# Patient Record
Sex: FEMALE | Race: WHITE | NOT HISPANIC OR LATINO | URBAN - METROPOLITAN AREA
[De-identification: names, ages, dates, MRNs, and addresses within clinical notes are randomized per-mention and may not be internally consistent; named-entity substitution may affect disease eponyms.]

---

## 2023-06-22 ENCOUNTER — APPOINTMENT (RX ONLY)
Dept: URBAN - METROPOLITAN AREA CLINIC 154 | Facility: CLINIC | Age: 48
Setting detail: DERMATOLOGY
End: 2023-06-22

## 2023-06-22 DIAGNOSIS — Z41.9 ENCOUNTER FOR PROCEDURE FOR PURPOSES OTHER THAN REMEDYING HEALTH STATE, UNSPECIFIED: ICD-10-CM

## 2023-06-22 PROCEDURE — ? MICRONEEDLING

## 2023-06-22 NOTE — PROCEDURE: MICRONEEDLING
Depth In Mm: 1
Depth In Mm: 0.1
Location #1: Forehead, nose, eyes
Consent: Written consent obtained, risks reviewed including but not limited to pain, scarring, infection and incomplete improvement.  Patient understands the procedure is cosmetic in nature and will require out of pocket payment.
Topical Anesthesia?: 23% lidocaine, 7% tetracaine
Detail Level: Zone
Treatment Number (Optional): 0
Depth In Mm: 0.5
Length Of Topical Anesthesia Application (Optional): 30 minutes
Post-Care Instructions: After the procedure, take precautions agains sun exposure. Do not apply sunscreen for 12 hours after the procedure. Do not apply make-up for 12 hours after the procedure. Avoid alcohol based toners for 10-14 days. After 2-3 days patients can return to their regular skin regimen.
Location #2: cheeks
Infusions (Optional): growth factor
Depth In Mm: 2

## 2023-08-31 ENCOUNTER — APPOINTMENT (RX ONLY)
Dept: URBAN - METROPOLITAN AREA CLINIC 154 | Facility: CLINIC | Age: 48
Setting detail: DERMATOLOGY
End: 2023-08-31

## 2023-08-31 DIAGNOSIS — Z41.9 ENCOUNTER FOR PROCEDURE FOR PURPOSES OTHER THAN REMEDYING HEALTH STATE, UNSPECIFIED: ICD-10-CM

## 2023-08-31 PROCEDURE — ? SCITON BBL HERO

## 2023-08-31 PROCEDURE — ? SCITON MOXI

## 2023-08-31 ASSESSMENT — LOCATION ZONE DERM: LOCATION ZONE: FACE

## 2023-08-31 ASSESSMENT — LOCATION SIMPLE DESCRIPTION DERM: LOCATION SIMPLE: RIGHT CHEEK

## 2023-08-31 ASSESSMENT — LOCATION DETAILED DESCRIPTION DERM: LOCATION DETAILED: RIGHT INFERIOR CENTRAL MALAR CHEEK

## 2023-08-31 NOTE — PROCEDURE: SCITON BBL HERO
Fluence (J/Cm2) - Will Not Render If 0: 5
Fluence (J/Cm2) - Will Not Render If 0: 0
Total Pulses (Optional): 500
Temp (C): 20
Number Of Prepaid Treatments (Will Not Render If 0): 3
Total Pulses (Optional): 500
Spot Size: Finesse Adapter Size: 15 x 15 mm square
Add Setting 2?: yes
Skin Type: III
Add Setting 3?: no
Pulse Width Units: milliseconds
Spot Size: Finesse Adapter Size: 11 mm round
Filter: 515nm Filter
Filter: 560nm Filter
Detail Level: Zone
Topical Anesthesia Type: 23% lidocaine, 7% tetracaine
Temp (C): 25
Temp (C): 20
Consent: Written consent obtained.  Risks reviewed including but not limited to crusting, scabbing, blistering, scarring, darker or lighter pigmentary change, and incomplete clearance.
Temp (C): 24
Procedure Note: After applying gel and applying protective eyeware, treatment was administered using the setting parameters listed above.
Anesthesia Type: 1% lidocaine with epinephrine
Post-Care Instructions: I reviewed with the patient in detail post-care instructions. Patient should avoid sun exposure before and after treatment.  Patient should wear sunscreen.
Rate (Hz): 4
Treatment Number: 1

## 2023-08-31 NOTE — PROCEDURE: SCITON MOXI
Treatment Number: 1
Post-Care Instructions: I reviewed with the patient in detail post-care instructions.  Recommended diligent sun protection and sun avoidance.
Total Coverage (%): 20%
Pre=procedure Text: After consent was obtained, the treatment areas were cleaned and treated using the parameters noted above.
Anesthesia Type: 1% lidocaine with epinephrine
Length Of Topical Anesthesia Application (Optional): 30 minutes
Consent: Written consent obtained.  Risks were reviewed including but not limited to crusting, scabbing, blistering, scarring, darker or lighter pigmentary change, incomplete improvement, prolonged erythema and facial swelling, infection, and bleeding.
Topical Anesthesia?: 23% lidocaine, 7% tetracaine
Detail Level: Detailed
Energy (J/Cm2): 20
Level 3
Number Of Passes: 6
External Cooling Fan Speed: 5
Laser Type: Fractionated 1927nm, thulium laser

## 2023-10-23 ENCOUNTER — APPOINTMENT (RX ONLY)
Dept: URBAN - METROPOLITAN AREA CLINIC 154 | Facility: CLINIC | Age: 48
Setting detail: DERMATOLOGY
End: 2023-10-23

## 2023-10-23 DIAGNOSIS — Z41.9 ENCOUNTER FOR PROCEDURE FOR PURPOSES OTHER THAN REMEDYING HEALTH STATE, UNSPECIFIED: ICD-10-CM

## 2023-10-23 PROCEDURE — ? SCITON MOXI

## 2023-10-23 PROCEDURE — ? SCITON BBL HERO

## 2023-10-23 ASSESSMENT — LOCATION ZONE DERM
LOCATION ZONE: FACE
LOCATION ZONE: NECK

## 2023-10-23 ASSESSMENT — LOCATION DETAILED DESCRIPTION DERM
LOCATION DETAILED: RIGHT INFERIOR CENTRAL MALAR CHEEK
LOCATION DETAILED: RIGHT SUPERIOR LATERAL NECK

## 2023-10-23 ASSESSMENT — LOCATION SIMPLE DESCRIPTION DERM
LOCATION SIMPLE: RIGHT CHEEK
LOCATION SIMPLE: RIGHT ANTERIOR NECK

## 2023-10-23 NOTE — PROCEDURE: SCITON BBL HERO
Pulse Width Units: milliseconds
Spot Size: Finesse Adapter Size: 15 x 15 mm square
Consent: Written consent obtained.  Risks reviewed including but not limited to crusting, scabbing, blistering, scarring, darker or lighter pigmentary change, and incomplete clearance.
Add Setting 5?: no
Temp (C): 20
Temp (C): 25
Filter: 560nm Filter
Rate (Hz): 4
Anesthesia Type: 1% lidocaine with epinephrine
Spot Size: Finesse Adapter Size: 11 mm round
Treatment Number: 2
Fluence (J/Cm2) - Will Not Render If 0: 5
Post-Care Instructions: I reviewed with the patient in detail post-care instructions. Patient should avoid sun exposure before and after treatment.  Patient should wear sunscreen.
Procedure Note: After applying gel and applying protective eyeware, treatment was administered using the setting parameters listed above.
Number Of Prepaid Treatments (Will Not Render If 0): 3
Fluence (J/Cm2) - Will Not Render If 0: 0
Total Pulses (Optional): 500
Topical Anesthesia Type: 23% lidocaine, 7% tetracaine
Add Setting 2?: yes
Detail Level: Zone
Total Pulses (Optional): 500
Filter: 515nm Filter
Temp (C): 20
[Negative] : Genitourinary

## 2023-10-23 NOTE — PROCEDURE: SCITON MOXI
Total Coverage (%): 20
Treatment Number: 1
Detail Level: Simple
Pre=procedure Text: After consent was obtained, the treatment areas were cleaned and treated using the parameters noted above.
Post-Care Instructions: I reviewed with the patient in detail post-care instructions.  Recommended diligent sun protection and sun avoidance.
Anesthesia Type: 1% lidocaine with epinephrine
Number Of Passes: 6
Energy (J/Cm2): 20
Laser Type: Fractionated 1927nm, thulium laser
External Cooling Fan Speed: 5
Level 3
Consent: Written consent obtained.  Risks were reviewed including but not limited to crusting, scabbing, blistering, scarring, darker or lighter pigmentary change, incomplete improvement, prolonged erythema and facial swelling, infection, and bleeding.

## 2023-12-16 ENCOUNTER — APPOINTMENT (RX ONLY)
Dept: URBAN - METROPOLITAN AREA CLINIC 154 | Facility: CLINIC | Age: 48
Setting detail: DERMATOLOGY
End: 2023-12-16

## 2023-12-16 DIAGNOSIS — Z41.9 ENCOUNTER FOR PROCEDURE FOR PURPOSES OTHER THAN REMEDYING HEALTH STATE, UNSPECIFIED: ICD-10-CM

## 2023-12-16 PROCEDURE — ? SCITON BBL HERO

## 2023-12-16 PROCEDURE — ? SCITON MOXI

## 2023-12-16 ASSESSMENT — LOCATION DETAILED DESCRIPTION DERM: LOCATION DETAILED: RIGHT INFERIOR CENTRAL MALAR CHEEK

## 2023-12-16 ASSESSMENT — LOCATION ZONE DERM: LOCATION ZONE: FACE

## 2023-12-16 ASSESSMENT — LOCATION SIMPLE DESCRIPTION DERM: LOCATION SIMPLE: RIGHT CHEEK

## 2023-12-16 NOTE — PROCEDURE: SCITON BBL HERO
Pulse Width - Will Not Render If 0: 0
Total Pulses (Optional): 600
Pulse Width Units: milliseconds
Total Pulses (Optional): 500
Add Setting 6?: no
Fluence (J/Cm2) - Will Not Render If 0: 5
Post-Care Instructions: I reviewed with the patient in detail post-care instructions. Patient should avoid sun exposure before and after treatment.  Patient should wear sunscreen.
Spot Size: Finesse Adapter Size: 15 x 15 mm square
Topical Anesthesia Type: 23% lidocaine, 7% tetracaine
Rate (Hz): 4
Procedure Note: After applying gel and applying protective eyeware, treatment was administered using the setting parameters listed above.
Anesthesia Type: 1% lidocaine with epinephrine
Consent: Written consent obtained.  Risks reviewed including but not limited to crusting, scabbing, blistering, scarring, darker or lighter pigmentary change, and incomplete clearance.
Spot Size: Finesse Adapter Size: 11 mm round
Spot Size: Finesse Adapter Size: 15 x 45 mm (No Finesse Adapter)
Temp (C): 25
Temp (C): 20
Filter: 560nm Filter
Filter: 640nm Filter
Filter: 515nm Filter
Detail Level: Zone
Add Setting 2?: yes
Pulse Width - Will Not Render If 0: 3

## 2023-12-16 NOTE — PROCEDURE: SCITON MOXI
Total Coverage (%): 20
Post-Care Instructions: I reviewed with the patient in detail post-care instructions.  Recommended diligent sun protection and sun avoidance.
Pre=procedure Text: After consent was obtained, the treatment areas were cleaned and treated using the parameters noted above.
Anesthesia Type: 1% lidocaine with epinephrine
Number Of Passes: 6
Energy (J/Cm2): 20
Level 3
Detail Level: Zone
Laser Type: Fractionated 1927nm, thulium laser
Consent: Written consent obtained.  Risks were reviewed including but not limited to crusting, scabbing, blistering, scarring, darker or lighter pigmentary change, incomplete improvement, prolonged erythema and facial swelling, infection, and bleeding.
External Cooling Fan Speed: 5
Topical Anesthesia?: 23% lidocaine, 7% tetracaine
Length Of Topical Anesthesia Application (Optional): 30 minutes